# Patient Record
Sex: FEMALE | Race: BLACK OR AFRICAN AMERICAN
[De-identification: names, ages, dates, MRNs, and addresses within clinical notes are randomized per-mention and may not be internally consistent; named-entity substitution may affect disease eponyms.]

---

## 2021-04-02 ENCOUNTER — HOSPITAL ENCOUNTER (EMERGENCY)
Dept: HOSPITAL 61 - ER | Age: 66
Discharge: HOME | End: 2021-04-02
Payer: COMMERCIAL

## 2021-04-02 VITALS — SYSTOLIC BLOOD PRESSURE: 140 MMHG | DIASTOLIC BLOOD PRESSURE: 68 MMHG

## 2021-04-02 VITALS — HEIGHT: 67 IN | BODY MASS INDEX: 31.45 KG/M2 | WEIGHT: 200.4 LBS

## 2021-04-02 DIAGNOSIS — G89.29: ICD-10-CM

## 2021-04-02 DIAGNOSIS — R07.89: Primary | ICD-10-CM

## 2021-04-02 DIAGNOSIS — I10: ICD-10-CM

## 2021-04-02 DIAGNOSIS — R61: ICD-10-CM

## 2021-04-02 DIAGNOSIS — H53.8: ICD-10-CM

## 2021-04-02 DIAGNOSIS — E11.9: ICD-10-CM

## 2021-04-02 LAB
ALBUMIN SERPL-MCNC: 4.1 G/DL (ref 3.4–5)
ALBUMIN/GLOB SERPL: 1.3 {RATIO} (ref 1–1.7)
ALP SERPL-CCNC: 75 U/L (ref 46–116)
ALT SERPL-CCNC: 19 U/L (ref 14–59)
ANION GAP SERPL CALC-SCNC: 11 MMOL/L (ref 6–14)
ANISOCYTOSIS BLD QL SMEAR: (no result)
APTT PPP: YELLOW S
AST SERPL-CCNC: 9 U/L (ref 15–37)
BACTERIA #/AREA URNS HPF: (no result) /HPF
BASOPHILS # BLD AUTO: 0 X10^3/UL (ref 0–0.2)
BASOPHILS NFR BLD: 0 % (ref 0–3)
BILIRUB SERPL-MCNC: 0.6 MG/DL (ref 0.2–1)
BILIRUB UR QL STRIP: NEGATIVE
BUN SERPL-MCNC: 20 MG/DL (ref 7–20)
BUN/CREAT SERPL: 22 (ref 6–20)
CALCIUM SERPL-MCNC: 9.2 MG/DL (ref 8.5–10.1)
CHLORIDE SERPL-SCNC: 104 MMOL/L (ref 98–107)
CO2 SERPL-SCNC: 25 MMOL/L (ref 21–32)
CREAT SERPL-MCNC: 0.9 MG/DL (ref 0.6–1)
EOSINOPHIL NFR BLD: 0.1 X10^3/UL (ref 0–0.7)
EOSINOPHIL NFR BLD: 2 % (ref 0–3)
ERYTHROCYTE [DISTWIDTH] IN BLOOD BY AUTOMATED COUNT: 20.1 % (ref 11.5–14.5)
FIBRINOGEN PPP-MCNC: CLEAR MG/DL
GFR SERPLBLD BASED ON 1.73 SQ M-ARVRAT: 76.3 ML/MIN
GLUCOSE SERPL-MCNC: 156 MG/DL (ref 70–99)
HCT VFR BLD CALC: 41.9 % (ref 36–47)
HGB BLD-MCNC: 13.8 G/DL (ref 12–15.5)
LYMPHOCYTES # BLD: 1.8 X10^3/UL (ref 1–4.8)
LYMPHOCYTES NFR BLD AUTO: 43 % (ref 24–48)
MCH RBC QN AUTO: 26 PG (ref 25–35)
MCHC RBC AUTO-ENTMCNC: 33 G/DL (ref 31–37)
MCV RBC AUTO: 79 FL (ref 79–100)
MONO #: 0.3 X10^3/UL (ref 0–1.1)
MONOCYTES NFR BLD: 8 % (ref 0–9)
NEUT #: 1.9 X10^3/UL (ref 1.8–7.7)
NEUTROPHILS NFR BLD AUTO: 47 % (ref 31–73)
NITRITE UR QL STRIP: NEGATIVE
PH UR STRIP: 5 [PH]
PLATELET # BLD AUTO: 245 X10^3/UL (ref 140–400)
PLATELET # BLD EST: ADEQUATE 10*3/UL
POTASSIUM SERPL-SCNC: 3.8 MMOL/L (ref 3.5–5.1)
PROT SERPL-MCNC: 7.3 G/DL (ref 6.4–8.2)
PROT UR STRIP-MCNC: NEGATIVE MG/DL
RBC # BLD AUTO: 5.32 X10^6/UL (ref 3.5–5.4)
RBC #/AREA URNS HPF: 0 /HPF (ref 0–2)
SODIUM SERPL-SCNC: 140 MMOL/L (ref 136–145)
UROBILINOGEN UR-MCNC: 0.2 MG/DL
WBC # BLD AUTO: 4.1 X10^3/UL (ref 4–11)
WBC #/AREA URNS HPF: 0 /HPF (ref 0–4)
YEAST #/AREA URNS HPF: PRESENT /HPF

## 2021-04-02 PROCEDURE — 99285 EMERGENCY DEPT VISIT HI MDM: CPT

## 2021-04-02 PROCEDURE — 85379 FIBRIN DEGRADATION QUANT: CPT

## 2021-04-02 PROCEDURE — 81001 URINALYSIS AUTO W/SCOPE: CPT

## 2021-04-02 PROCEDURE — 83880 ASSAY OF NATRIURETIC PEPTIDE: CPT

## 2021-04-02 PROCEDURE — 84484 ASSAY OF TROPONIN QUANT: CPT

## 2021-04-02 PROCEDURE — 71046 X-RAY EXAM CHEST 2 VIEWS: CPT

## 2021-04-02 PROCEDURE — 71275 CT ANGIOGRAPHY CHEST: CPT

## 2021-04-02 PROCEDURE — 85025 COMPLETE CBC W/AUTO DIFF WBC: CPT

## 2021-04-02 PROCEDURE — 36415 COLL VENOUS BLD VENIPUNCTURE: CPT

## 2021-04-02 PROCEDURE — 80053 COMPREHEN METABOLIC PANEL: CPT

## 2021-04-02 NOTE — RAD
PA and lateral chest.



HISTORY: Chest pain, mid chest pain



PA and lateral views were taken of the chest. Lungs are free of infiltrates. Heart is normal in size.
 There is no pleural effusion. There is mild scoliosis.



IMPRESSION:

1. No acute chest disease.



Electronically signed by: Umberto Bhagat MD (4/2/2021 1:11 PM) [id number]

## 2021-04-02 NOTE — ED.ADGEN
Past Medical History


Past Medical History:  Diabetes-Type II, Hypertension


Additional Past Medical Histor:  Chronic knee pain


Past Surgical History:  Cholecystectomy, 


Smoking Status:  Never Smoker


Alcohol Use:  Occasionally


Drug Use:  None





General Adult


EDM:


Chief Complaint:  CHEST WALL PAIN





HPI:


HPI:





Patient is a 65  year old female coming in by EMS for chest pain.  Patient 

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

chest pain that began to break to her back.  Patient also complained of blurred 

vision and diaphoresis.  Says the pain lasted 3 to 4 minutes and is gone now.  

Took 324 mg aspirin while at work.  Had her blood pressure and blood sugar 

checked at work which were normal.  Denies any tobacco, alcohol, drug use.  Has 

a family history significant for father and sister with cardiac complications in

their 70s.  Patient is a history of hypertension diabetes.  States she is 

compliant her medications.  Denies any cough, fevers patient states pain is 

resolved at this time but complaining of a left-sided frontal headache





Review of Systems:


Review of Systems:


All other systems within normal limits except for as noted in the HPI





Current Medications:





Current Medications








 Medications


  (Trade)  Dose


 Ordered  Sig/Zonia  Start Time


 Stop Time Status Last Admin


Dose Admin


 


 Info


  (CONTRAST GIVEN


 -- Rx MONITORING)  1 each  PRN DAILY  PRN  21 13:45


 21 13:44   





 


 Iohexol


  (Omnipaque 350


 Mg/ml)  90 ml  1X  ONCE  21 13:45


 21 13:46 DC 21 13:51


90 ML











Allergies:


Allergies:





Allergies








Coded Allergies Type Severity Reaction Last Updated Verified


 


  No Known Drug Allergies    16 No











Physical Exam:


PE:


Constitutional: Well developed, well nourished, no acute distress, non-toxic 

appearance. []


HENT: Normocephalic, atraumatic, bilateral external ears normal,  nose normal. 

[]


Eyes: PERRLA, conjunctiva normal, no discharge. [] 


Neck: No rigidity, supple, no stridor. [] 


Cardiovascular: Regular rate and rhythm, brisk cap refill, symmetric radial 

pulses.  []


Lungs & Thorax: Non labored symmetric respirations, no tachypnea or respiratory 

distress []


Abdomen: Soft, nondistended.


Skin: Warm, dry, no erythema, no rash. [] 


Back: Unremarkable


Extremities: No deformities, range of motion grossly intact, no lower extremity 

edema [] 


Neurologic: Alert and oriented X 3, no focal deficits noted. []


Psychologic: Affect normal, judgement normal, mood normal. []





Current Patient Data:


Labs:





                                Laboratory Tests








Test


 21


12:30 21


13:16 21


15:10


 


White Blood Count


 4.1 x10^3/uL


(4.0-11.0) 


 





 


Red Blood Count


 5.32 x10^6/uL


(3.50-5.40) 


 





 


Hemoglobin


 13.8 g/dL


(12.0-15.5) 


 





 


Hematocrit


 41.9 %


(36.0-47.0) 


 





 


Mean Corpuscular Volume


 79 fL ()


 


 





 


Mean Corpuscular Hemoglobin 26 pg (25-35)    


 


Mean Corpuscular Hemoglobin


Concent 33 g/dL


(31-37) 


 





 


Red Cell Distribution Width


 20.1 %


(11.5-14.5)  H 


 





 


Platelet Count


 245 x10^3/uL


(140-400) 


 





 


Neutrophils (%) (Auto) 47 % (31-73)    


 


Lymphocytes (%) (Auto) 43 % (24-48)    


 


Monocytes (%) (Auto) 8 % (0-9)    


 


Eosinophils (%) (Auto) 2 % (0-3)    


 


Basophils (%) (Auto) 0 % (0-3)    


 


Neutrophils # (Auto)


 1.9 x10^3/uL


(1.8-7.7) 


 





 


Lymphocytes # (Auto)


 1.8 x10^3/uL


(1.0-4.8) 


 





 


Monocytes # (Auto)


 0.3 x10^3/uL


(0.0-1.1) 


 





 


Eosinophils # (Auto)


 0.1 x10^3/uL


(0.0-0.7) 


 





 


Basophils # (Auto)


 0.0 x10^3/uL


(0.0-0.2) 


 





 


Platelet Estimate


 Adequate


(ADEQUATE) 


 





 


Large Platelets Occ    


 


Anisocytosis Mod    


 


D-Dimer (Carissa)


 0.76 ug/mlFEU


(0.00-0.50)  H 


 





 


Sodium Level


 140 mmol/L


(136-145) 


 





 


Potassium Level


 3.8 mmol/L


(3.5-5.1) 


 





 


Chloride Level


 104 mmol/L


() 


 





 


Carbon Dioxide Level


 25 mmol/L


(21-32) 


 





 


Anion Gap 11 (6-14)    


 


Blood Urea Nitrogen


 20 mg/dL


(7-20) 


 





 


Creatinine


 0.9 mg/dL


(0.6-1.0) 


 





 


Estimated GFR


(Cockcroft-Gault) 76.3  


 


 





 


BUN/Creatinine Ratio 22 (6-20)  H  


 


Glucose Level


 156 mg/dL


(70-99)  H 


 





 


Calcium Level


 9.2 mg/dL


(8.5-10.1) 


 





 


Total Bilirubin


 0.6 mg/dL


(0.2-1.0) 


 





 


Aspartate Amino Transferase


(AST) 9 U/L (15-37)


L 


 





 


Alanine Aminotransferase (ALT)


 19 U/L (14-59)


 


 





 


Alkaline Phosphatase


 75 U/L


() 


 





 


Troponin I Quantitative


 < 0.017 ng/mL


(0.000-0.055) 


 < 0.017 ng/mL


(0.000-0.055)


 


NT-Pro-B-Type Natriuretic


Peptide 36 pg/mL


(0-124) 


 





 


Total Protein


 7.3 g/dL


(6.4-8.2) 


 





 


Albumin


 4.1 g/dL


(3.4-5.0) 


 





 


Albumin/Globulin Ratio 1.3 (1.0-1.7)    


 


Urine Collection Type  Unknown   


 


Urine Color  Yellow   


 


Urine Clarity  Clear   


 


Urine pH


 


 5.0 (<5.0-8.0)


 





 


Urine Specific Gravity


 


 >=1.030


(1.000-1.030) 





 


Urine Protein


 


 Negative mg/dL


(NEG-TRACE) 





 


Urine Glucose (UA)


 


 >=1000 mg/dL


(NEG) 





 


Urine Ketones (Stick)


 


 Negative mg/dL


(NEG) 





 


Urine Blood


 


 Negative (NEG)


 





 


Urine Nitrite


 


 Negative (NEG)


 





 


Urine Bilirubin


 


 Negative (NEG)


 





 


Urine Urobilinogen Dipstick


 


 0.2 mg/dL (0.2


mg/dL) 





 


Urine Leukocyte Esterase


 


 Negative (NEG)


 





 


Urine RBC  0 /HPF (0-2)   


 


Urine WBC  0 /HPF (0-4)   


 


Urine Squamous Epithelial


Cells 


 Occ /LPF  


 





 


Urine Bacteria


 


 Few /HPF


(0-FEW) 





 


Urine Yeast  Present /HPF   





                                Laboratory Tests


21 12:30








                                Laboratory Tests


21 12:30








Vital Signs:





                                   Vital Signs








  Date Time  Temp Pulse Resp B/P (MAP) Pulse Ox O2 Delivery O2 Flow Rate FiO2


 


21 12:23 98.2 80 20 140/84 (102) 95 Room Air  





 98.2       











EKG:


EKG:


Sinus rhythm, heart information minute, no ST elevation depression.  Incomplete 

right bundle branch block with slight left axis deviation []





Heart Score:


C/O Chest Pain:  Yes


HEART Score for Chest Pain:  








HEART Score for Chest Pain Response (Comments) Value


 


History Slighlty/Non-Suspicious 0


 


ECG Nonspecific Repolarizatio 1


 


Age >45 - < 65 1


 


Risk Factors                            1 or 2 Risk Factors 1


 


Troponin < Normal Limit 0


 


Total  3








Risk Factors:


Risk Factors:  DM, Current or recent (<one month) smoker, HTN, HLP, family 

history of CAD, obesity.


Risk Scores:


Score 0 - 3:  2.5% MACE over next 6 weeks - Discharge Home


Score 4 - 6:  20.3% MACE over next 6 weeks - Admit for Clinical Observation


Score 7 - 10:  72.7% MACE over next 6 weeks - Early Invasive Strategies





Radiology/Procedures:


Radiology/Procedures:





CTA CHEST





History: Chest pain





Technique: CT of the chest was performed with intravenous contrast. PE protocol.

 Maximum intensity projection coronal and sagittal reconstructions were 

performed.





Exposure: One or more of the following individualized dose reduction techniques 

were utilized for this examination:  


1. Automated exposure control  


2. Adjustment of the mA and/or kV according to patient size  


3. Use of iterative reconstruction technique.





Comparison: None





Findings: 


Chest: No pulmonary embolism. No aortic aneurysm or dissection. Mild 

atheromatous plaque within the aorta and branch vessels. Prior artery 

calcifications. Calcified mediastinal and hilar lymph nodes, likely prior gr

anulomatous disease.





Right inferior thyroid nodule measures 1.4 cm.





2 mm left upper lobe pleural-based nodule (series 3 image 53).





Upper abdomen: Prior cholecystectomy.





Bones: No pathologic osseous lesions.





Impression:


1.  No acute thoracic pathology.


2.  Tiny pulmonary nodule. Recommend one-year follow-up if high risk.


3.  Right inferior thyroid nodule.


[]





Course & Med Decision Making:


Course & Med Decision Making


Pertinent Labs and Imaging studies reviewed. (See chart for details)





[]





Dragon Disclaimer:


Dragon Disclaimer:


This electronic medical record was generated, in whole or in part, using a voice

 recognition dictation system.





Departure


Departure


Impression:  


   Primary Impression:  


   Chest pain


Disposition:  01 DC HOME SELF CARE/HOMELESS


Condition:  STABLE


Referrals:  


DANISH DAMIAN DO (PCP)


Patient Instructions:  Chest Pain (Nonspecific)











DIEGO CROFT MD             2021 12:16

## 2021-04-02 NOTE — RAD
CTA CHEST



History: Chest pain



Technique: CT of the chest was performed with intravenous contrast. PE protocol. Maximum intensity pr
ojection coronal and sagittal reconstructions were performed.



Exposure: One or more of the following individualized dose reduction techniques were utilized for thi
s examination:  

1. Automated exposure control  

2. Adjustment of the mA and/or kV according to patient size  

3. Use of iterative reconstruction technique.



Comparison: None



Findings: 

Chest: No pulmonary embolism. No aortic aneurysm or dissection. Mild atheromatous plaque within the a
kevin and branch vessels. Prior artery calcifications. Calcified mediastinal and hilar lymph nodes, li
[place] prior granulomatous disease.



Right inferior thyroid nodule measures 1.4 cm.



2 mm left upper lobe pleural-based nodule (series 3 image 53).



Upper abdomen: Prior cholecystectomy.



Bones: No pathologic osseous lesions.



Impression:

1.  No acute thoracic pathology.

2.  Tiny pulmonary nodule. Recommend one-year follow-up if high risk.

3.  Right inferior thyroid nodule.



Electronically signed by: Tej Hutton DO (4/2/2021 3:08 PM) [id number]

## 2022-04-02 ENCOUNTER — HOSPITAL ENCOUNTER (EMERGENCY)
Dept: HOSPITAL 61 - ER | Age: 67
Discharge: HOME | End: 2022-04-02
Payer: COMMERCIAL

## 2022-04-02 VITALS — BODY MASS INDEX: 29.06 KG/M2 | HEIGHT: 69 IN | WEIGHT: 196.21 LBS

## 2022-04-02 VITALS — DIASTOLIC BLOOD PRESSURE: 63 MMHG | SYSTOLIC BLOOD PRESSURE: 122 MMHG

## 2022-04-02 DIAGNOSIS — G89.29: ICD-10-CM

## 2022-04-02 DIAGNOSIS — R42: ICD-10-CM

## 2022-04-02 DIAGNOSIS — R55: Primary | ICD-10-CM

## 2022-04-02 DIAGNOSIS — R19.7: ICD-10-CM

## 2022-04-02 DIAGNOSIS — I10: ICD-10-CM

## 2022-04-02 DIAGNOSIS — E11.9: ICD-10-CM

## 2022-04-02 LAB
ALBUMIN SERPL-MCNC: 3.9 G/DL (ref 3.4–5)
ALBUMIN/GLOB SERPL: 1.4 {RATIO} (ref 1–1.7)
ALP SERPL-CCNC: 60 U/L (ref 46–116)
ALT SERPL-CCNC: 18 U/L (ref 14–59)
ANION GAP SERPL CALC-SCNC: 11 MMOL/L (ref 6–14)
ANISOCYTOSIS BLD QL SMEAR: (no result)
AST SERPL-CCNC: 7 U/L (ref 15–37)
BACTERIA #/AREA URNS HPF: 0 /HPF
BASOPHILS # BLD AUTO: 0 X10^3/UL (ref 0–0.2)
BASOPHILS NFR BLD: 0 % (ref 0–3)
BILIRUB SERPL-MCNC: 0.6 MG/DL (ref 0.2–1)
BUN SERPL-MCNC: 17 MG/DL (ref 7–20)
BUN/CREAT SERPL: 14 (ref 6–20)
CALCIUM SERPL-MCNC: 8.5 MG/DL (ref 8.5–10.1)
CHLORIDE SERPL-SCNC: 105 MMOL/L (ref 98–107)
CO2 SERPL-SCNC: 26 MMOL/L (ref 21–32)
CREAT SERPL-MCNC: 1.2 MG/DL (ref 0.6–1)
EOSINOPHIL NFR BLD: 0 X10^3/UL (ref 0–0.7)
EOSINOPHIL NFR BLD: 1 % (ref 0–3)
ERYTHROCYTE [DISTWIDTH] IN BLOOD BY AUTOMATED COUNT: 21.1 % (ref 11.5–14.5)
GFR SERPLBLD BASED ON 1.73 SQ M-ARVRAT: 54.6 ML/MIN
GLUCOSE SERPL-MCNC: 195 MG/DL (ref 70–99)
HCT VFR BLD CALC: 40.4 % (ref 36–47)
HGB BLD-MCNC: 12.9 G/DL (ref 12–15.5)
INFLUENZA A PATIENT: NEGATIVE
INFLUENZA B PATIENT: NEGATIVE
LYMPHOCYTES # BLD: 1.2 X10^3/UL (ref 1–4.8)
LYMPHOCYTES NFR BLD AUTO: 16 % (ref 24–48)
MCH RBC QN AUTO: 26 PG (ref 25–35)
MCHC RBC AUTO-ENTMCNC: 32 G/DL (ref 31–37)
MCV RBC AUTO: 80 FL (ref 79–100)
MONO #: 0.4 X10^3/UL (ref 0–1.1)
MONOCYTES NFR BLD: 5 % (ref 0–9)
NEUT #: 5.7 X10^3/UL (ref 1.8–7.7)
NEUTROPHILS NFR BLD AUTO: 78 % (ref 31–73)
PLATELET # BLD AUTO: 251 X10^3/UL (ref 140–400)
PLATELET # BLD EST: ADEQUATE 10*3/UL
POIKILOCYTOSIS BLD QL SMEAR: SLIGHT
POTASSIUM SERPL-SCNC: 3.6 MMOL/L (ref 3.5–5.1)
PROT SERPL-MCNC: 6.6 G/DL (ref 6.4–8.2)
RBC # BLD AUTO: 5.07 X10^6/UL (ref 3.5–5.4)
RBC #/AREA URNS HPF: 0 /HPF (ref 0–2)
SODIUM SERPL-SCNC: 142 MMOL/L (ref 136–145)
WBC # BLD AUTO: 7.3 X10^3/UL (ref 4–11)
WBC #/AREA URNS HPF: (no result) /HPF (ref 0–4)

## 2022-04-02 PROCEDURE — 70450 CT HEAD/BRAIN W/O DYE: CPT

## 2022-04-02 PROCEDURE — 85025 COMPLETE CBC W/AUTO DIFF WBC: CPT

## 2022-04-02 PROCEDURE — 87428 SARSCOV & INF VIR A&B AG IA: CPT

## 2022-04-02 PROCEDURE — 80053 COMPREHEN METABOLIC PANEL: CPT

## 2022-04-02 PROCEDURE — 96361 HYDRATE IV INFUSION ADD-ON: CPT

## 2022-04-02 PROCEDURE — 82962 GLUCOSE BLOOD TEST: CPT

## 2022-04-02 PROCEDURE — 84484 ASSAY OF TROPONIN QUANT: CPT

## 2022-04-02 PROCEDURE — 96360 HYDRATION IV INFUSION INIT: CPT

## 2022-04-02 PROCEDURE — 74022 RADEX COMPL AQT ABD SERIES: CPT

## 2022-04-02 PROCEDURE — 93005 ELECTROCARDIOGRAM TRACING: CPT

## 2022-04-02 PROCEDURE — 81001 URINALYSIS AUTO W/SCOPE: CPT

## 2022-04-02 PROCEDURE — 99285 EMERGENCY DEPT VISIT HI MDM: CPT

## 2022-04-02 PROCEDURE — 36415 COLL VENOUS BLD VENIPUNCTURE: CPT

## 2022-04-02 NOTE — RAD
Acute Abdominal Series:



Technique:  PA view of the chest and supine and upright views of the abdomen were obtained.



History:  Pain.



Comparison: None.



Findings:



The lungs and pleural margins are clear. There is a paucity of bowel gas. There is no free air. 



Impression: 





No acute findings.



Electronically signed by: Grant Stockton III, MD (4/2/2022 12:36 PM) [facility]

## 2022-04-02 NOTE — RAD
STUDY: CT head without contrast



INDICATION: Near syncope. Dizziness.



COMPARISON: 4/4/2016



TECHNIQUE: Axial CT imaging through the head without the use of intravenous contrast. Sagittal and co
theresa reformats were obtained.



One or more of the following individualized dose reduction techniques were utilized for this examinat
ion:  



1. Automated exposure control

2. Adjustment of the mA and/or kV according to patient size

3. Use of iterative reconstruction technique.



FINDINGS:



No acute intracranial hemorrhage. No mass effect, midline shift or hydrocephalus. Gray-white matter d
ifferentiation is maintained.



Intact calvarium. No layering fluid seen within the visualized paranasal sinuses. Unremarkable mastoi
d air cells and middle ears.



IMPRESSION:



No acute intracranial abnormality by CT.



Electronically signed by: JESUS LINCOLN MD (4/2/2022 1:10 PM) [facility]

## 2022-04-02 NOTE — EKG
[facility]

              [address] [place] [address]

Test Date:    2022               Test Time:    12:12:03

Pat Name:     HEMALATHA PANTOJA         Department:   

Patient ID:   [id number]           Room:          

Gender:       F                        Technician:   

:          [id number]               Requested By: TEE CONNOLLY

Order Number: [id number]           Reading MD:   Silas Thomas

                                 Measurements

Intervals                              Axis          

Rate:         57                       P:            34

ND:           162                      QRS:          -47

QRSD:         128                      T:            27

QT:           462                                    

QTc:          453                                    

                           Interpretive Statements

SINUS RHYTHM

ABNORMAL LEFT AXIS DEVIATION

LEFT ANTERIOR FASCICULAR BLOCK

RIGHT BUNDLE BRANCH BLOCK

BIFASCICULAR BLOCK

Electronically Signed On 2022 21:15:23 CDT by Silas Thomas